# Patient Record
Sex: FEMALE | Employment: FULL TIME | URBAN - METROPOLITAN AREA
[De-identification: names, ages, dates, MRNs, and addresses within clinical notes are randomized per-mention and may not be internally consistent; named-entity substitution may affect disease eponyms.]

---

## 2017-11-07 ENCOUNTER — OFFICE VISIT (OUTPATIENT)
Dept: ORTHOPEDIC SURGERY | Age: 37
End: 2017-11-07

## 2017-11-07 VITALS
DIASTOLIC BLOOD PRESSURE: 76 MMHG | HEART RATE: 62 BPM | SYSTOLIC BLOOD PRESSURE: 122 MMHG | WEIGHT: 237 LBS | TEMPERATURE: 98 F | OXYGEN SATURATION: 100 % | HEIGHT: 71 IN | BODY MASS INDEX: 33.18 KG/M2

## 2017-12-05 ENCOUNTER — DOCUMENTATION ONLY (OUTPATIENT)
Dept: ORTHOPEDIC SURGERY | Age: 37
End: 2017-12-05

## 2017-12-05 ENCOUNTER — OFFICE VISIT (OUTPATIENT)
Dept: ORTHOPEDIC SURGERY | Age: 37
End: 2017-12-05

## 2017-12-05 VITALS
DIASTOLIC BLOOD PRESSURE: 76 MMHG | TEMPERATURE: 98.1 F | OXYGEN SATURATION: 99 % | HEIGHT: 71 IN | HEART RATE: 73 BPM | WEIGHT: 238.2 LBS | SYSTOLIC BLOOD PRESSURE: 129 MMHG | RESPIRATION RATE: 18 BRPM | BODY MASS INDEX: 33.35 KG/M2

## 2017-12-05 DIAGNOSIS — M75.101 TEAR OF RIGHT ROTATOR CUFF, UNSPECIFIED TEAR EXTENT: Primary | ICD-10-CM

## 2017-12-05 RX ORDER — DICLOFENAC SODIUM 10 MG/G
GEL TOPICAL
COMMUNITY
Start: 2017-09-11

## 2017-12-05 RX ORDER — IBUPROFEN 800 MG/1
TABLET ORAL
COMMUNITY
Start: 2017-12-04
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
## 2017-12-14 ENCOUNTER — HOSPITAL ENCOUNTER (OUTPATIENT)
Age: 37
Discharge: HOME | End: 2017-12-14
Attending: ORTHOPAEDIC SURGERY
Payer: OTHER GOVERNMENT

## 2017-12-14 DIAGNOSIS — M75.101 TEAR OF RIGHT ROTATOR CUFF, UNSPECIFIED TEAR EXTENT: ICD-10-CM

## 2017-12-14 PROCEDURE — 73221 MRI JOINT UPR EXTREM W/O DYE: CPT

## 2018-01-16 ENCOUNTER — OFFICE VISIT (OUTPATIENT)
Dept: ORTHOPEDIC SURGERY | Age: 38
End: 2018-01-16

## 2018-01-16 VITALS
HEIGHT: 71 IN | BODY MASS INDEX: 33.6 KG/M2 | HEART RATE: 63 BPM | OXYGEN SATURATION: 98 % | DIASTOLIC BLOOD PRESSURE: 73 MMHG | WEIGHT: 240 LBS | SYSTOLIC BLOOD PRESSURE: 125 MMHG | TEMPERATURE: 97.5 F

## 2018-01-16 DIAGNOSIS — M75.111 INCOMPLETE TEAR OF RIGHT ROTATOR CUFF: Primary | ICD-10-CM

## 2018-01-16 RX ORDER — TRIAMCINOLONE ACETONIDE 40 MG/ML
40 INJECTION, SUSPENSION INTRA-ARTICULAR; INTRAMUSCULAR ONCE
Qty: 1 ML | Refills: 0
Start: 2018-01-16 | End: 2018-01-16

## 2018-01-16 NOTE — PROGRESS NOTES
Brooklyn Osullivan  1980   Chief Complaint   Patient presents with    Shoulder Pain     right shoulder post mri        HISTORY OF PRESENT ILLNESS  Brooklyn Osullivan is a 42 y.o. female who presents today for reevaluation of right shoulder pain and to review MRI results. Patient rates pain as 6/10 today. She has had previous cortisone injections which eventually did not provide any more relief. Continues to have pain with overhead movements and reaching behind her back. She has night pain, finds herself waking up while sleeping. The patient is concerned that her pain while intensify as she ages. Patient denies any fever, chills, chest pain, shortness of breath or calf pain. There are no new illness or injuries to report since last seen in the office. There are no changes to medications, allergies, family or social history. PHYSICAL EXAM:   Visit Vitals    /73 (BP 1 Location: Left arm, BP Patient Position: Sitting)    Pulse 63    Temp 97.5 °F (36.4 °C)    Ht 5' 11\" (1.803 m)    Wt 240 lb (108.9 kg)    SpO2 98%    BMI 33.47 kg/m2     The patient is a well-developed, well-nourished female   in no acute distress. The patient is alert and oriented times three. The patient is alert and oriented times three. Mood and affect are normal.  LYMPHATIC: lymph nodes are not enlarged and are within normal limits  SKIN: normal in color and non tender to palpation. There are no bruises or abrasions noted. NEUROLOGICAL: Motor sensory exam is within normal limits. Reflexes are equal bilaterally.  There is normal sensation to pinprick and light touch  MUSCULOSKELETAL:  Examination Right shoulder   Skin Intact   AC joint tenderness -   Biceps tenderness -   Forward flexion/Elevation    Active abduction    Glenohumeral abduction 90   External rotation ROM 90   Internal rotation ROM 70   Apprehension -   Bos Relocation -   Jerk -   Load and Shift -   Obriens -   Speeds -   Impingement sign + Supraspinatus/Empty Can -, 5/5   External Rotation Strength -, 5/5   Lift Off/Belly Press -, 5/5   Neurovascular Intact      PROCEDURE: After sterile prep, 6 cc of Xylocaine and 1 cc of Kenalog were injected into the right shoulder. [facility] - [facility]  OFFICE PROCEDURE PROGRESS NOTE        Chart reviewed for the following:  Aissatou Juarez MD, have reviewed the History, Physical and updated the Allergic reactions for Elin Neff 40 performed immediately prior to start of procedure:  Aissatou Juarez MD, have performed the following reviews on [facility] prior to the start of the procedure:            * Patient was identified by name and date of birth   * Agreement on procedure being performed was verified  * Risks and Benefits explained to the patient  * Procedure site verified and marked as necessary  * Patient was positioned for comfort  * Consent was signed and verified     Time: 4:42 PM    Date of procedure: 1/16/2018    Procedure performed by:  Kayli Terrazas MD    Provider assisted by: (see medication administration)    How tolerated by patient: tolerated the procedure well with no complications    Comments: none      IMAGING: MRI of the right shoulder dated 12/14/17 was reviewed and read:   IMPRESSION:  1. Bursal surface fraying , mild interstitial tear of the supraspinatus tendon  with no full-thickness rupture, retraction or muscular atrophy. Mild subacromial  bursitis. 2. AC joint hypertrophy with inflammation, type II acromion and thickening of  the coracoacromial ligament. MRI of the right shoulder dated  11/29/16 was reviewed and read:   IMPRESSION:  1. Mild AC arthrosis and mild supraspinatus tendinosis with low-grade bursal sided fraying at critical zone. 2. Degeneration at the superior labrum. IMPRESSION:      ICD-10-CM ICD-9-CM    1.  Incomplete tear of right rotator cuff M75.111 840.4 TRIAMCINOLONE ACETONIDE INJ      triamcinolone acetonide (KENALOG) 40 mg/mL injection      DRAIN/INJECT LARGE JOINT/BURSA        PLAN:   1. I discussed the results of the MRI and the treatment options with the patient. Patient has an MRI documented right partial rotator cuff tear. She does not feel she is ready for surgery at this time. Risk factors include: n/a  2. Yes cortisone injection indicated today R SHOULDER  3. No Physical/Occupational Therapy indicated today  4. No diagnostic test indicated today  5. No durable medical equipment indicated today  6. No referral indicated today   7. No medications indicated today  8. No Narcotic indicated today    RTC 4 weeks if pain continues  Follow-up Disposition: Not on File    Scribed by Mary [address]) as dictated by Leesa Quiroz MD    I, Dr. Leesa Quiroz, confirm that all documentation is accurate.     Leesa Quiroz M.D.   [facility]

## 2018-02-20 ENCOUNTER — OFFICE VISIT (OUTPATIENT)
Dept: ORTHOPEDIC SURGERY | Age: 38
End: 2018-02-20

## 2018-02-20 VITALS
DIASTOLIC BLOOD PRESSURE: 94 MMHG | HEIGHT: 71 IN | HEART RATE: 79 BPM | OXYGEN SATURATION: 97 % | WEIGHT: 245.4 LBS | SYSTOLIC BLOOD PRESSURE: 117 MMHG | TEMPERATURE: 97.6 F | BODY MASS INDEX: 34.35 KG/M2

## 2018-02-20 DIAGNOSIS — M75.111 INCOMPLETE TEAR OF RIGHT ROTATOR CUFF: Primary | ICD-10-CM

## 2018-02-20 NOTE — PROGRESS NOTES
Yumiko Louis  1980   Chief Complaint   Patient presents with    Shoulder Pain     RIGHT, LAST INJECTION 3 WEEKS AGO        HISTORY OF PRESENT ILLNESS  Yumiko Louis is a 42 y.o. female who presents today for reevaluation of right shoulder pain. Patient rates pain as 6/10 today. At last OV, patient had a cortisone injection which provided a couple of weeks of relief. Her pain has been gradually returning. She has pain with overhead movements and reaching behind her back. She has night pain. Has taken ibuprofen. Patient works a desk job. Patient denies any fever, chills, chest pain, shortness of breath or calf pain. There are no new illness or injuries to report since last seen in the office. There are no changes to medications, allergies, family or social history. PHYSICAL EXAM:   Visit Vitals    BP (!) 117/94    Pulse 79    Temp 97.6 °F (36.4 °C)    Ht 5' 11\" (1.803 m)    Wt 245 lb 6.4 oz (111.3 kg)    SpO2 97%    BMI 34.23 kg/m2     The patient is a well-developed, well-nourished female   in no acute distress. The patient is alert and oriented times three. The patient is alert and oriented times three. Mood and affect are normal.  LYMPHATIC: lymph nodes are not enlarged and are within normal limits  SKIN: normal in color and non tender to palpation. There are no bruises or abrasions noted. NEUROLOGICAL: Motor sensory exam is within normal limits. Reflexes are equal bilaterally.  There is normal sensation to pinprick and light touch  MUSCULOSKELETAL:  Examination Right shoulder   Skin Intact   AC joint tenderness -   Biceps tenderness -   Forward flexion/Elevation    Active abduction    Glenohumeral abduction 90   External rotation ROM 90   Internal rotation ROM 70   Apprehension -   Bos Relocation -   Jerk -   Load and Shift -   Obriens -   Speeds -   Impingement sign +   Supraspinatus/Empty Can -, 5/5   External Rotation Strength -, 5/5   Lift Off/Belly Press -, 5/5 Neurovascular Intact        IMAGING: MRI of the right shoulder dated 12/14/17 was reviewed and read:   IMPRESSION:  1. Bursal surface fraying , mild interstitial tear of the supraspinatus tendon  with no full-thickness rupture, retraction or muscular atrophy. Mild subacromial bursitis. 2. AC joint hypertrophy with inflammation, type II acromion and thickening of the coracoacromial ligament. MRI of the right shoulder dated  11/29/16 was reviewed and read:   IMPRESSION:  1. Mild AC arthrosis and mild supraspinatus tendinosis with low-grade bursal sided fraying at critical zone. 2. Degeneration at the superior labrum. IMPRESSION:      ICD-10-CM ICD-9-CM    1. Incomplete tear of right rotator cuff M75.111 840.4         PLAN:   1. I discussed the risks and benefits and potential adverse outcomes of both operative vs non operative treatment of right rotator cuff tear with the patient. Patient wishes to proceed with right shoulder arthroscopic rotator cuff repair. Risks of operative intervention include but not limited to bleeding, infection, deep vein thrombosis, pulmonary embolism, death, limb length discrepancy, reflexive sympathetic dystrophy, fat embolism syndrome,damage to blood vessels and nerves, malunion, non-union, delayed union, failure of hardware, post traumatic arthritis, stroke, heart attack, and death. Patient understands that infection may arise and may require numerous surgeries. History and physical exam scheduled for a later date. Risk factors include: n/a  2. No cortisone injection indicated today   3. No Physical/Occupational Therapy indicated today  4. No diagnostic test indicated today  5. No durable medical equipment indicated today  6. No referral indicated today   7. No medications indicated today  8.  No Narcotic indicated today    RTC H&P  Follow-up Disposition: Not on File    Scribed by Mulu Curry [facility]) as dictated by MD DUSTY Mckeon Dr. Kayli Terrazas, confirm that all documentation is accurate.     Kayli Terrazas M.D.   [facility]

## 2018-08-14 ENCOUNTER — OFFICE VISIT (OUTPATIENT)
Dept: ORTHOPEDIC SURGERY | Age: 38
End: 2018-08-14

## 2018-08-14 VITALS
OXYGEN SATURATION: 98 % | HEIGHT: 71 IN | BODY MASS INDEX: 33.04 KG/M2 | WEIGHT: 236 LBS | SYSTOLIC BLOOD PRESSURE: 124 MMHG | DIASTOLIC BLOOD PRESSURE: 77 MMHG | HEART RATE: 63 BPM

## 2018-08-14 DIAGNOSIS — M75.111 INCOMPLETE TEAR OF RIGHT ROTATOR CUFF: Primary | ICD-10-CM

## 2018-08-14 RX ORDER — OXYCODONE HYDROCHLORIDE 5 MG/1
5 TABLET ORAL
Qty: 40 TAB | Refills: 0 | Status: SHIPPED | OUTPATIENT
Start: 2018-08-14

## 2018-08-14 RX ORDER — PROMETHAZINE HYDROCHLORIDE 25 MG/1
25 TABLET ORAL
Qty: 30 TAB | Refills: 0 | Status: SHIPPED | OUTPATIENT
Start: 2018-08-14

## 2018-08-14 RX ORDER — GABAPENTIN 300 MG/1
300 CAPSULE ORAL
Qty: 5 CAP | Refills: 0 | Status: SHIPPED | OUTPATIENT
Start: 2018-08-14 | End: 2018-08-19

## 2018-08-14 NOTE — PROGRESS NOTES
HISTORY AND PHYSICAL          Patient: Aggie Gomez                MRN: [id number]       SSN: [id number]  YOB: 1980          AGE: 34 y.o. SEX: female      Patient scheduled for:  Right shoulder arthroscopic rotator cuff repair    Surgeon: Emily Courtney MD    ANESTHESIA TYPE:  General    HISTORY:     The patient was seen in the office today for a preoperative history and physical for an upcoming above listed surgery. The patient is a pleasant 34 y.o. female who has a history of right shoulder pain. patient had a cortisone injection which provided a couple of weeks of relief. Her pain has been gradually returning. She has pain with overhead movements and reaching behind her back. She has night pain. Has taken ibuprofen. Patient works a desk job . Pain level is a 7/10. Due to the current findings, affected activity of daily living and continued pain and discomfort, surgical intervention is indicated. The alternatives, risks, and complications, including but not limited to infection, blood loss, need for blood transfusion, neurovascular damage, abby-incisional numbness, subcutaneous hematoma, bone fracture, anesthetic complications, DVT, PE, death, RSD, postoperative stiffness and pain, possible surgical scar, delayed healing and nonhealing, reflexive sympathetic dystrophy, damage to blood vessels and nerves, need for more surgery, MI, and stroke,  failure of hardware, gait disturbances,have been discussed. The patient understands and wishes to proceed with surgery. PAST MEDICAL HISTORY:     History reviewed. No pertinent past medical history. CURRENT MEDICATIONS:     Current Outpatient Prescriptions   Medication Sig Dispense Refill    gabapentin (NEURONTIN) 300 mg capsule Take 1 Cap by mouth nightly for 5 days. START NIGHT OF SURGERY 5 Cap 0    oxyCODONE IR (ROXICODONE) 5 mg immediate release tablet Take 1 Tab by mouth every four (4) hours as needed for Pain.  Max Daily Amount: 30 mg. DO NOT TAKE UNTIL AFTER SURGERY 40 Tab 0    promethazine (PHENERGAN) 25 mg tablet Take 1 Tab by mouth every six (6) hours as needed for Nausea. 30 Tab 0    diclofenac (VOLTAREN) 1 % gel       ibuprofen (MOTRIN) 800 mg tablet          ALLERGIES:     Allergies   Allergen Reactions    Other Food Rash     Sulfa ingredients         SURGICAL HISTORY:     Past Surgical History:   Procedure Laterality Date    HX KNEE ARTHROSCOPY  1993       SOCIAL HISTORY:     Social History     Social History    Marital status: UNKNOWN     Spouse name: N/A    Number of children: N/A    Years of education: N/A     Social History Main Topics    Smoking status: Never Smoker    Smokeless tobacco: Never Used    Alcohol use Yes      Comment: SOCIALLY    Drug use: No    Sexual activity: Not Asked     Other Topics Concern    None     Social History Narrative       FAMILY HISTORY:     Family History   Problem Relation Age of Onset    Diabetes Mother     Diabetes Maternal Grandfather        REVIEW OF SYSTEMS:     Negative for fevers, chills, chest pain, shortness of breath, weight loss, recent illness     General: Negative for fever and chills. No unexpected change in weight. Denies fatigue. No change in appetite. Skin: Negative for rash or itching. HEENT: Negative for congestion, sore throat, neck pain and neck stiffness. No change in vision or hearing. Hasn't noted any enlarged lymph nodes in the neck. Cardiovascular:  Negative for chest pain and palpitations. Has not noted pedal edema. Respiratory: Negative for cough, colds, sinus, hemoptysis, shortness of breath and wheezing. Gastrointestinal: Negative for nausea and vomiting, rectal bleeding, coffee ground emesis, abdominal pain, diarrhea and constipation. Genitourinary: Negative for dysuria, frequency urgency, or burning on micturition. No flank pain, no foul smelling urine, no difficulty with initiating urination.    Hematological: Negative for bleeding or easy bruising. Musculoskeletal: Negative  for arthralgias, back pain or neck pain. Neurological: Negative for dizziness, seizures or syncopal episodes. Denies headaches. Endocrine: Denies excessive thirst.  No heat/cold intolerance. Psychiatric: Negative for depression or insomnia. PHYSICAL EXAMINATION:     VITALS:   Visit Vitals    /77    Pulse 63    Ht 5' 11\" (1.803 m)    Wt 236 lb (107 kg)    SpO2 98%    BMI 32.92 kg/m2     GEN:  Well developed, well nourished 34 y.o. female in no acute distress. HEENT: Normocephalic and atraumatic. Eyes: Conjunctivae and EOM are normal.Pupils are equal, round, and reactive to light. External ear normal appearance, external nose normal appearing. Mouth/Throat: Oropharynx is clear and moist, able to handle oral secretions w/out difficulty, airway patent  NECK: Supple. Normal ROM, No lymphadenopathy. Trachea is midline. No bruising, swelling or deformity  RESP: Clear to auscultation bilaterally. No wheezes, rales, rhonchi. Normal effort and breath sounds. No respiratory distress  CARDIO:  Normal rate, regular rhythm and normal heart sounds. No MGR. ABDOMEN: Soft, non-tender, non-distended, normoactive bowel sounds in all four quadrants. There is no tenderness. There is no rebound and no guarding.    BACK: No CVA or spinal tenderness  BREAST:  Deferred  PELVIC:    Deferred   RECTAL:  Deferred   :           Deferred  EXTREMITIES: EXAMINATION OF: right shoulder  Examination Right shoulder   Skin Intact   AC joint tenderness -   Biceps tenderness -   Forward flexion/Elevation    Active abduction    Glenohumeral abduction 90   External rotation ROM 90   Internal rotation ROM 70   Apprehension -   Bos Relocation -   Jerk -   Load and Shift -   Obriens -   Speeds -   Impingement sign +   Supraspinatus/Empty Can +   External Rotation Strength -, 5/5   Lift Off/Belly Press -, 5/5   Neurovascular Intact       NEUROVASCULAR: Sensation intact to light touch and strength grossly intact and symmetrical. No nystagmus. Positive distal pulses and capillary refill. DVT ASSESSMENT:  There is not  calf tenderness. No evidence of DVT seen on physical exam.  MOTOR: In tact  PSYCH: Alert an oriented to person, place and time. Mood, memory, affect, behavior and judgment normal       RADIOGRAPHS & DIAGNOSTIC STUDIES:     MRI/xray reveals : MRI of the right shoulder dated 12/14/17 was reviewed and read:   IMPRESSION:  1. Bursal surface fraying , mild interstitial tear of the supraspinatus tendon  with no full-thickness rupture, retraction or muscular atrophy. Mild subacromial bursitis. 2. AC joint hypertrophy with inflammation, type II acromion and thickening of the coracoacromial ligament. LABS:       None needed    ASSESSMENT:       Encounter Diagnosis   Name Primary?  Incomplete tear of right rotator cuff Yes       PLAN:     Again, the alternatives, risks, and complications, as well as expected outcome were discussed. The patient understands and agrees to proceed with right shoulder arthroscopic rotator cuff repair.  Patient given orders listed below:    Orders Placed This Encounter    gabapentin (NEURONTIN) 300 mg capsule    oxyCODONE IR (ROXICODONE) 5 mg immediate release tablet    promethazine (PHENERGAN) 25 mg tablet         Robyn Elizondo PA-C  8/14/2018  2:53 PM

## 2018-08-24 ENCOUNTER — TELEPHONE (OUTPATIENT)
Dept: ORTHOPEDIC SURGERY | Age: 38
End: 2018-08-24

## 2018-08-24 NOTE — TELEPHONE ENCOUNTER
Emily Walker and I both looked and were unable to find it at [facility].  Spoke with patient and she is going to bring in a copy at her next visit

## 2018-08-24 NOTE — TELEPHONE ENCOUNTER
Patient called asking if we received some Susana paperwork that was faxed to the [facility] . She states it has been faxed several times. Please advise patient if needed at [phone].

## 2018-08-28 ENCOUNTER — OFFICE VISIT (OUTPATIENT)
Dept: ORTHOPEDIC SURGERY | Age: 38
End: 2018-08-28

## 2018-08-28 VITALS — WEIGHT: 236 LBS | BODY MASS INDEX: 33.04 KG/M2 | HEIGHT: 71 IN

## 2018-08-28 DIAGNOSIS — M75.101 TEAR OF RIGHT ROTATOR CUFF, UNSPECIFIED TEAR EXTENT: Primary | ICD-10-CM

## 2018-08-28 RX ORDER — TRAMADOL HYDROCHLORIDE 50 MG/1
50 TABLET ORAL
Qty: 40 TAB | Refills: 0 | Status: SHIPPED | OUTPATIENT
Start: 2018-08-28

## 2018-08-28 NOTE — PROGRESS NOTES
Chelsea Rivas  1980     HISTORY OF PRESENT ILLNESS  Chelsea Rivas is a 34 y.o. female who presents today for evaluation s/p Right shoulder arthroscopic rotator cuff repair on 8/23/18. Patient has been going to PT. Describes pain as a 8/10. Has been taking ibuprofen for pain. Still has night pain. Patient denies any fever, chills, chest pain, shortness of breath or calf pain. There are no new illness or injuries to report since last seen in the office. PHYSICAL EXAM:   Visit Vitals    Ht 5' 11\" (1.803 m)    Wt 236 lb (107 kg)    LMP 08/06/2018    BMI 32.92 kg/m2      The patient is a well-developed, well-nourished female in no acute distress. The patient is alert and oriented times three. The patient appears to be well groomed. Mood and affect are normal.  ORTHOPEDIC EXAM of right shoulder:  Inspection: swelling not present,  Bruising not present  Incision, clean, dry, intact, sutures in place  Passive glenohumeral abduction 0-45 degrees  Stability: Stable  Strength: n/a  2+ distal pulses    IMPRESSION:  S/P Right shoulder arthroscopic rotator cuff repair    PLAN:   Incisions cleaned. Surgery was discussed at length today. Patient to continue with PROM and PT. Continue wearing sling. Stressed to patient that nothing causes an increase in pain.   RTC 3 weeks    Robyn Elizondo PA-C  [address] [place] [address] and Spine Specialist

## 2018-09-06 ENCOUNTER — DOCUMENTATION ONLY (OUTPATIENT)
Dept: ORTHOPEDIC SURGERY | Age: 38
End: 2018-09-06

## 2018-09-06 NOTE — PROGRESS NOTES
Arthuro Guillermo form faxed to [facility] location. Please advise at [phone] when complete.

## 2018-09-17 ENCOUNTER — OFFICE VISIT (OUTPATIENT)
Dept: ORTHOPEDIC SURGERY | Age: 38
End: 2018-09-17

## 2018-09-17 ENCOUNTER — DOCUMENTATION ONLY (OUTPATIENT)
Dept: ORTHOPEDIC SURGERY | Age: 38
End: 2018-09-17

## 2018-09-17 VITALS
TEMPERATURE: 98.2 F | WEIGHT: 229 LBS | SYSTOLIC BLOOD PRESSURE: 129 MMHG | HEIGHT: 71 IN | DIASTOLIC BLOOD PRESSURE: 75 MMHG | HEART RATE: 73 BPM | BODY MASS INDEX: 32.06 KG/M2 | OXYGEN SATURATION: 99 %

## 2018-09-17 DIAGNOSIS — M75.111 INCOMPLETE TEAR OF RIGHT ROTATOR CUFF: Primary | ICD-10-CM

## 2018-09-17 RX ORDER — LORAZEPAM 0.5 MG/1
TABLET ORAL
COMMUNITY
Start: 2018-09-04

## 2018-09-17 RX ORDER — BUPROPION HCL 150 MG
TABLET, EXTENDED RELEASE 24 HR ORAL
COMMUNITY
Start: 2018-08-13

## 2018-09-17 NOTE — LETTER
NOTIFICATION RETURN TO WORK / SCHOOL 
 
9/17/2018 1:56 PM 
 
Ms. Hina Anderson [address] [place] [id number] To Whom It May Concern: 
 
Hina Anderson is currently under the care of [address] [place] [address]. She had surgery on 8/23/18. Her estimated return to work date is 10/22/18. If there are questions or concerns please have the patient contact our office. Sincerely, Dane Foster MD

## 2018-09-17 NOTE — PROGRESS NOTES
Left voicemail to return our call. If patient calls back, please come get nurse Silvestre Linton, I have questions regarding form.

## 2018-09-17 NOTE — PROGRESS NOTES
Maxwell Calle  1980     HISTORY OF PRESENT ILLNESS  Maxwell Calle is a 34 y.o. female who presents today for evaluation s/p Right shoulder arthroscopic rotator cuff repair on 8/23/18. Patient has been going to PT. Describes pain as a 7/10. The patient notes some pain after PT sessions. Has been doing exercises at home. Has been taking ibuprofen for pain. The patient uses ice for the pain. Still has night pain, has tried sitting up to sleep. Patient denies any fever, chills, chest pain, shortness of breath or calf pain. There are no new illness or injuries to report since last seen in the office. PHYSICAL EXAM:   Visit Vitals    /75    Pulse 73    Temp 98.2 °F (36.8 °C) (Oral)    Ht 5' 11\" (1.803 m)    Wt 229 lb (103.9 kg)    SpO2 99%    BMI 31.94 kg/m2      The patient is a well-developed, well-nourished female in no acute distress. The patient is alert and oriented times three. The patient appears to be well groomed. Mood and affect are normal.  ORTHOPEDIC EXAM of right shoulder:  Inspection: swelling not present,  Bruising not present  Incision, clean, dry, intact, sutures in place  Passive glenohumeral abduction 0-45 degrees  Stability: Stable  Strength: n/a  2+ distal pulses    IMPRESSION:  S/P Right shoulder arthroscopic rotator cuff repair    PLAN:   Incisions cleaned. Surgery was discussed at length today. Patient to continue with PROM and transition to AROM in two weeks. Cont PT. Continue wearing sling for 2 weeks. Stressed to patient that nothing causes an increase in pain. I would like her to try taking Tramadol during the day to see if that helps the pain. RTC 4 weeks    Scribed by Erica Sanchez [facility]) as dictated by Armin Hoffmann MD    I, Dr. Armin Hoffmann, confirm that all documentation is accurate.     Armin Hoffmann M.D.   [facility]

## 2018-10-01 ENCOUNTER — TELEPHONE (OUTPATIENT)
Dept: ORTHOPEDIC SURGERY | Age: 38
End: 2018-10-01

## 2018-10-01 NOTE — TELEPHONE ENCOUNTER
Dr. Haven Urrutia, physical therapist at [phone] [facility] DR COLUMBA CASTANEDA, Is asking if she clear to progress on her range of motion next appt with Dr. Marian Lo is [phone]

## 2018-10-03 ENCOUNTER — TELEPHONE (OUTPATIENT)
Dept: ORTHOPEDIC SURGERY | Age: 38
End: 2018-10-03

## 2018-10-15 ENCOUNTER — OFFICE VISIT (OUTPATIENT)
Dept: ORTHOPEDIC SURGERY | Age: 38
End: 2018-10-15

## 2018-10-15 DIAGNOSIS — M75.101 TEAR OF RIGHT ROTATOR CUFF, UNSPECIFIED TEAR EXTENT: Primary | ICD-10-CM

## 2018-10-15 NOTE — PROGRESS NOTES
Tawana Kim 1980 HISTORY OF PRESENT ILLNESS Tawana Kim is a 34 y.o. female who presents today for evaluation s/p Right shoulder arthroscopic rotator cuff repair on 8/23/18. Patient has been going to PT. Describes pain as a ***/10. The patient notes some pain after PT sessions. Has been doing exercises at home. Has been taking ibuprofen for pain. The patient uses ice for the pain. Still has night pain, has tried sitting up to sleep. Patient denies any fever, chills, chest pain, shortness of breath or calf pain. There are no new illness or injuries to report since last seen in the office. PHYSICAL EXAM:  
There were no vitals taken for this visit. The patient is a well-developed, well-nourished female in no acute distress. The patient is alert and oriented times three. The patient appears to be well groomed. Mood and affect are normal. 
LYMPHATIC: lymph nodes are not enlarged and are within normal limits SKIN: normal in color and non tender to palpation. There are no bruises or abrasions noted. NEUROLOGICAL: Motor sensory exam is within normal limits. Reflexes are equal bilaterally. There is normal sensation to pinprick and light touch ORTHOPEDIC EXAM of right shoulder: Inspection: swelling not present,  Bruising not present Incision, clean, dry, intact, sutures in place Passive glenohumeral abduction 0-45 degrees Stability: Stable Strength: n/a 
2+ distal pulses IMPRESSION:  S/P Right shoulder arthroscopic rotator cuff repair PLAN:  
1. *** Risk factors include: *** 
2. {YES (DEF)/NO:45326} ultrasound exam indicated today 3. {YES (DEF)/NO:81035} cortisone injection indicated today 4. {YES (DEF)/NO:45937} Physical/Occupational Therapy indicated today 5. {YES (DEF)/NO:05548} diagnostic test indicated today: 6. {YES (DEF)/NO:87153} durable medical equipment indicated today 7. {YES (DEF)/NO:89738} referral indicated today 8. {YES (DEF)/NO:23869} medications indicated today:  
9. {YES (DEF)/NO:05751} Narcotic indicated today for short term acute pain secondary to ***. Patient given pain medication for short term acute pain relief. Goal is to treat patient according to above plan and to ultimately have patient off all pain medications once appropriate. If chronic pain management is required beyond what is expected for current orthopedic problem, will refer patient to pain management.  was reviewed and will be reviewed with every medication refill request.  
 
 
RTC *** Incisions cleaned. Surgery was discussed at length today. Patient to continue with PROM and transition to AROM in two weeks. Cont PT. Continue wearing sling for 2 weeks. Stressed to patient that nothing causes an increase in pain. I would like her to try taking Tramadol during the day to see if that helps the pain. RTC *** Scribed by Lamar Mercado ([address]) as dictated by Waqas Tinoco MD 
 
I, Dr. Waqas Tinoco, confirm that all documentation is accurate.  
 
Waqas Tinoco M.D.  
[facility]

## 2018-10-15 NOTE — PROGRESS NOTES
Diego Watts  1980     HISTORY OF PRESENT ILLNESS  Diego Watts is a 34 y.o. female who presents today for evaluation s/p Right shoulder arthroscopic rotator cuff repair on 8/23/18. Patient has been going to PT. Describes pain as a 6/10. The patient notes some pain after PT sessions. Has been doing exercises at home. Has been taking ibuprofen for pain. The patient uses ice for the pain. Still has night pain, has tried sitting up to sleep. Patient denies any fever, chills, chest pain, shortness of breath or calf pain. There are no new illness or injuries to report since last seen in the office. PHYSICAL EXAM:   There were no vitals taken for this visit. The patient is a well-developed, well-nourished female in no acute distress. The patient is alert and oriented times three. The patient appears to be well groomed. Mood and affect are normal.  ORTHOPEDIC EXAM of right shoulder:  Inspection: swelling not present,  Bruising not present  Incisions well healed  Passive glenohumeral abduction 0-90 degrees  Stability: Stable  Strength: n/a  2+ distal pulses    IMPRESSION:  S/P Right shoulder arthroscopic rotator cuff repair    PLAN:   1. Patient improving post operatively  2. Will cont with PT. D/c sling today. arom now.  No lifting greater than 5lbs      RTC 4 weeks    [facility]

## 2018-10-16 ENCOUNTER — DOCUMENTATION ONLY (OUTPATIENT)
Dept: ORTHOPEDIC SURGERY | Age: 38
End: 2018-10-16

## 2018-10-19 ENCOUNTER — TELEPHONE (OUTPATIENT)
Dept: ORTHOPEDIC SURGERY | Age: 38
End: 2018-10-19

## 2018-10-19 NOTE — TELEPHONE ENCOUNTER
(post op) Pt is calling because she is having tingling and numbess in her rt hand. Please advise pt at [phone].

## 2018-10-19 NOTE — LETTER
NOTIFICATION RETURN TO WORK / SCHOOL 
 
10/22/2018 11:18 AM 
 
Ms. Judy Haley [address] [place] [phone] To Whom It May Concern: 
 
Judy Haley is currently under the care of [address] [place] [address] Jose [facility] Schwartz. She will return to work on 10/29/18. If there are questions or concerns please have the patient contact our office. Sincerely, Tavo Baird MD

## 2018-10-19 NOTE — TELEPHONE ENCOUNTER
Have her remove her sling when sedentary.  Have her bend her elbow and her wrist. Cont to ice shoulder

## 2018-10-19 NOTE — TELEPHONE ENCOUNTER
Pt is calling because she has some more questions regarding messages above. Pt is aware that Monday might be when she will hear from someone. Please advise pt at [phone].

## 2018-10-22 ENCOUNTER — DOCUMENTATION ONLY (OUTPATIENT)
Dept: ORTHOPEDIC SURGERY | Age: 38
End: 2018-10-22

## 2018-10-22 NOTE — TELEPHONE ENCOUNTER
Informed patient that her work note is ready to be picked up at the [facility] location. Patient also had questions regarding numbness and tingling in her pinky and ring finger of operative arm. She would like to know if this is normal and/or how long should she expect to be having this.

## 2018-10-22 NOTE — TELEPHONE ENCOUNTER
Spoke with patient, she was supposed to return to work today but since she is still having tingling and numbness she would like to be out of work a little longer. Can we change her return to work date to 10/29/18.

## 2018-10-22 NOTE — TELEPHONE ENCOUNTER
This can happen from wearing a sling. Have her remove her sling when sedentary.  Have her bend her elbow and her wrist. Cont to ice shoulder

## 2018-10-26 ENCOUNTER — TELEPHONE (OUTPATIENT)
Dept: ORTHOPEDIC SURGERY | Age: 38
End: 2018-10-26

## 2018-10-26 NOTE — LETTER
NOTIFICATION RETURN TO WORK / SCHOOL 
 
10/26/2018 1:05 PM 
 
Ms. Yaima Villegas [address] [place] [phone] To Whom It May Concern: 
 
Yaima Villegas is currently under the care of [facility] [place] [facility]. She will remain on a no duty work status until she is re-evaluated at her follow up appointment on 11/12/18. If there are questions or concerns please have the patient contact our office. Sincerely, Orlando Baumann MD

## 2018-10-26 NOTE — TELEPHONE ENCOUNTER
Patient called to advise that she's still experiencing numbness in her hand and cannot return to work on Monday as planned. Her rt hand went numb 4x during pt yesterday. She has an upcoming appt 11/12 and isn't sure if she should plan on staying out that long or just wait to see when the numbness subsides. She's being reassessed by p/t on 10/31.  Please review and advise patient at [phone]

## 2018-10-26 NOTE — TELEPHONE ENCOUNTER
Carlos Hartmann from [employer] is calling for clarification on work status. Please advise Carlos Hartmann at [phone].

## 2018-10-26 NOTE — TELEPHONE ENCOUNTER
Put in new work note to keep patient out of work until 11/12/18. Spoke with patient and informed her that we have a new letter ready to be picked up at the [facility] location.

## 2018-11-12 ENCOUNTER — OFFICE VISIT (OUTPATIENT)
Dept: ORTHOPEDIC SURGERY | Age: 38
End: 2018-11-12

## 2018-11-12 VITALS
HEART RATE: 70 BPM | SYSTOLIC BLOOD PRESSURE: 137 MMHG | DIASTOLIC BLOOD PRESSURE: 54 MMHG | TEMPERATURE: 97.2 F | OXYGEN SATURATION: 99 % | HEIGHT: 71 IN | WEIGHT: 235 LBS | BODY MASS INDEX: 32.9 KG/M2

## 2018-11-12 DIAGNOSIS — M54.2 NECK PAIN: ICD-10-CM

## 2018-11-12 DIAGNOSIS — M75.121 COMPLETE TEAR OF RIGHT ROTATOR CUFF: Primary | ICD-10-CM

## 2018-11-12 RX ORDER — METHYLPREDNISOLONE 4 MG/1
TABLET ORAL
Qty: 1 DOSE PACK | Refills: 0 | Status: SHIPPED | OUTPATIENT
Start: 2018-11-12

## 2018-11-12 NOTE — PROGRESS NOTES
Bryce Lamb  1980     HISTORY OF PRESENT ILLNESS  Bryce Lamb is a 34 y.o. female who presents today for evaluation s/p Right shoulder arthroscopic rotator cuff repair on 18. Patient has been going to PT. Describes pain as a 5/10. Feels like the shoulder is improving however still having spasms in her neck with intermittent numbness of her hand. She still has night pain. Patient denies any fever, chills, chest pain, shortness of breath or calf pain. The remainder of the review of systems is negative. There are no new illness or injuries to report since last seen in the office. No changes in medications, allergies, social or family history. Arellanoduran Bee PHYSICAL EXAM:   Visit Vitals  /54   Pulse 70   Temp 97.2 °F (36.2 °C)   Ht 5' 10.5\" (1.791 m)   Wt 235 lb (106.6 kg)   SpO2 99%   BMI 33.24 kg/m²      The patient is a well-developed, well-nourished female in no acute distress. The patient is alert and oriented times three. The patient appears to be well groomed. Mood and affect are normal.  ORTHOPEDIC EXAM of right shoulder:  Inspection: swelling not present,  Bruising not present  Incisions well healed  Passive glenohumeral abduction 0-90 degrees, able to reach up overhead  Stability: Stable  Strength: 4+/5  2+ distal pulses  Examination Neck   Skin Intact   Tenderness, Paracervical +   Paracervical spasms  +   Flexion Decreased 25%   Extension Decreased 25%   Lateral bend left Normal   Lateral bend right Normal   Masses -   Spurling sign -   Biceps reflex Normal   Triceps reflex Normal   Brachioradialis reflex Normal   Sensation Normal     IMAGIN view xray images of cspine on 2018 read and reviewed by myself reveal loss of cervical lordosis     IMPRESSION:  S/P Right shoulder arthroscopic rotator cuff repair    PLAN:   1. Patient with persistent paresthesias. Will place on medrol dose pack. Will cont with PT as listed below  Risk factors include: n/a  2.  No cortisone injection indicated today   3. Yes Physical/Occupational Therapy indicated today : WILL CONT WITH gentle strengthening and eval and treat cspine  4. No diagnostic test indicated today:   5. No durable medical equipment indicated today  6. No referral indicated today   7. YES medications indicated today: medrol dose pack  8.  No Narcotic indicated today     RTC 3 weeks    HERIBERTO [facility]

## 2018-11-14 ENCOUNTER — DOCUMENTATION ONLY (OUTPATIENT)
Dept: ORTHOPEDIC SURGERY | Facility: CLINIC | Age: 38
End: 2018-11-14

## 2018-11-14 NOTE — PROGRESS NOTES
Michell Toussaint Return to Work form was received via fax and put in the forms bin at [employer].

## 2018-11-15 ENCOUNTER — TELEPHONE (OUTPATIENT)
Dept: ORTHOPEDIC SURGERY | Age: 38
End: 2018-11-15

## 2018-11-15 NOTE — LETTER
NOTIFICATION RETURN TO WORK / SCHOOL 
 
11/19/2018 11:36 AM 
 
Ms. Tawana Kim [address] [place] [phone] To Whom It May Concern: 
 
Tawana Kim is currently under the care of [address] [place] [address] [place]. She is to remain out of work until 12/3/18. If there are questions or concerns please have the patient contact our office. Sincerely, ALLIE Musa

## 2018-11-15 NOTE — TELEPHONE ENCOUNTER
Left message to return call. If patient returns call please find [facility] [employer]. I have questions regarding her form.

## 2018-11-16 NOTE — TELEPHONE ENCOUNTER
Patient returned call to office - staff gone for the day, patient left a number to reach her on Monday - [phone]

## 2018-11-19 NOTE — TELEPHONE ENCOUNTER
Spoke with patient this morning, she states that she is not back to work yet. She states was prescribed a medrol dose pack and was to be out of work until re-evaluated at f/u appt on Dec. 3rd.

## 2018-12-04 ENCOUNTER — OFFICE VISIT (OUTPATIENT)
Dept: ORTHOPEDIC SURGERY | Age: 38
End: 2018-12-04

## 2018-12-04 VITALS
RESPIRATION RATE: 16 BRPM | OXYGEN SATURATION: 99 % | DIASTOLIC BLOOD PRESSURE: 75 MMHG | TEMPERATURE: 97.4 F | HEIGHT: 71 IN | SYSTOLIC BLOOD PRESSURE: 125 MMHG | WEIGHT: 227 LBS | HEART RATE: 64 BPM | BODY MASS INDEX: 31.78 KG/M2

## 2018-12-04 DIAGNOSIS — G56.21 ULNAR NEUROPATHY OF RIGHT UPPER EXTREMITY: ICD-10-CM

## 2018-12-04 DIAGNOSIS — M75.121 COMPLETE TEAR OF RIGHT ROTATOR CUFF: Primary | ICD-10-CM

## 2018-12-04 NOTE — PROGRESS NOTES
Elo Talavera 1980 HISTORY OF PRESENT ILLNESS Elo Talavera is a 34 y.o. female who presents today for evaluation s/p Right shoulder arthroscopic rotator cuff repair on 18. Patient has been going to PT. Describes pain as a 5/10. Feels like the shoulder is improving however still having spasms in her neck with intermittent numbness of her hand. She still has night pain. She reports the pain is better than before surgery, but the ROM is still lacking. States she cannot reach behind her back. She states there is numbness in her 4th and 5th digit. Patient denies any fever, chills, chest pain, shortness of breath or calf pain. The remainder of the review of systems is negative. There are no new illness or injuries to report since last seen in the office. No changes in medications, allergies, social or family history. Ilir Miranda PHYSICAL EXAM:  
Visit Vitals /75 Pulse 64 Temp 97.4 °F (36.3 °C) (Oral) Resp 16 Ht 5' 10.5\" (1.791 m) Wt 227 lb (103 kg) SpO2 99% BMI 32.11 kg/m² The patient is a well-developed, well-nourished female in no acute distress. The patient is alert and oriented times three. The patient appears to be well groomed. Mood and affect are normal. 
ORTHOPEDIC EXAM of right shoulder: Inspection: swelling not present,  Bruising not present Incisions well healed Passive glenohumeral abduction 0-90 degrees, able to reach up overhead Stability: Stable Strength: 4+/5 
2+ distal pulses Examination Neck Skin Intact Tenderness, Paracervical + Paracervical spasms  + Flexion Decreased 25% Extension Decreased 25% Lateral bend left Normal  
Lateral bend right Normal  
Masses - Spurling sign - Biceps reflex Normal  
Triceps reflex Normal  
Brachioradialis reflex Normal  
Sensation Normal  
numbnes over ulnar nerve distribution IMAGIN view xray images of cspine on 2018 read and reviewed by myself reveal loss of cervical lordosis IMPRESSION:  S/P Right shoulder arthroscopic rotator cuff repair PLAN:  
1. Patient with presents today with numbness in the 4th and 5th digit. I would like her to get an EMG to assess the ulnar nerve. Risk factors include: n/a 2. No cortisone injection indicated today 3. No Physical Therapy indicated today: 
4. Yes diagnostic test indicated today: EMG R UPPER EXT 5. No durable medical equipment indicated today 6. No referral indicated today 7. No medications indicated today: 8. No Narcotic indicated today RTC following EMG Scribed by Thanh Morales ([address]) as dictated by Emmanuel Aguilar MD 
 
I, Dr. Emmanuel Aguilar, confirm that all documentation is accurate.  
 
Emmanuel Aguilar M.D.  
[facility]

## 2018-12-05 ENCOUNTER — DOCUMENTATION ONLY (OUTPATIENT)
Dept: ORTHOPEDIC SURGERY | Age: 38
End: 2018-12-05

## 2018-12-05 NOTE — PROGRESS NOTES
Susana Extension Form was received via fax and put in the forms bin at [facility].

## 2018-12-06 ENCOUNTER — DOCUMENTATION ONLY (OUTPATIENT)
Dept: ORTHOPEDIC SURGERY | Age: 38
End: 2018-12-06

## 2018-12-06 ENCOUNTER — TELEPHONE (OUTPATIENT)
Dept: ORTHOPEDIC SURGERY | Age: 38
End: 2018-12-06

## 2018-12-06 NOTE — LETTER
NOTIFICATION RETURN TO WORK / SCHOOL 
 
12/10/2018 8:37 AM 
 
Ms. Sally Vidal [address] [place] [phone] To Whom It May Concern: 
 
Sally Vidal is currently under the care of [place] [facility]. She will remain on a no duty work status until she is re-evaluated at her follow up appointment on 1/8/19. If there are questions or concerns please have the patient contact our office. Sincerely, ALLIE Rivera

## 2018-12-06 NOTE — TELEPHONE ENCOUNTER
Pt calling to speak with Emma Flores. Pt states she was told to call when some documentation was finished and gave no further information. Please advise pt at [phone].

## 2018-12-10 ENCOUNTER — DOCUMENTATION ONLY (OUTPATIENT)
Dept: ORTHOPEDIC SURGERY | Age: 38
End: 2018-12-10

## 2019-01-02 ENCOUNTER — OFFICE VISIT (OUTPATIENT)
Dept: ORTHOPEDIC SURGERY | Age: 39
End: 2019-01-02

## 2019-01-02 VITALS
DIASTOLIC BLOOD PRESSURE: 80 MMHG | HEART RATE: 64 BPM | OXYGEN SATURATION: 97 % | BODY MASS INDEX: 34.72 KG/M2 | HEIGHT: 71 IN | SYSTOLIC BLOOD PRESSURE: 122 MMHG | TEMPERATURE: 98.1 F | WEIGHT: 248 LBS | RESPIRATION RATE: 16 BRPM

## 2019-01-02 DIAGNOSIS — R20.2 NUMBNESS AND TINGLING IN RIGHT HAND: Primary | ICD-10-CM

## 2019-01-02 DIAGNOSIS — R20.0 NUMBNESS AND TINGLING IN RIGHT HAND: Primary | ICD-10-CM

## 2019-01-02 PROBLEM — E66.01 SEVERE OBESITY (HCC): Status: ACTIVE | Noted: 2019-01-02
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
## 2019-01-07 ENCOUNTER — TELEPHONE (OUTPATIENT)
Dept: ORTHOPEDIC SURGERY | Age: 39
End: 2019-01-07

## 2019-01-07 NOTE — TELEPHONE ENCOUNTER
Spoke with patient, she was checking to see if we received any forms for her job. I checked in the forms bin and did not see form. Patient will have them re-fax form.

## 2019-01-07 NOTE — TELEPHONE ENCOUNTER
Patient called office to r/s 1/8 appt as provider out of office, was r/s to 1/9, however, patient is now requesting a call back from the office - did not wish to leave a specific reason for call back request.  Please contact patient at [phone]

## 2019-01-09 ENCOUNTER — OFFICE VISIT (OUTPATIENT)
Dept: ORTHOPEDIC SURGERY | Age: 39
End: 2019-01-09

## 2019-01-09 VITALS
HEART RATE: 73 BPM | DIASTOLIC BLOOD PRESSURE: 71 MMHG | TEMPERATURE: 99.3 F | HEIGHT: 70 IN | SYSTOLIC BLOOD PRESSURE: 127 MMHG | BODY MASS INDEX: 33.79 KG/M2 | RESPIRATION RATE: 16 BRPM | WEIGHT: 236 LBS | OXYGEN SATURATION: 96 %

## 2019-01-09 DIAGNOSIS — R20.2 NUMBNESS AND TINGLING IN RIGHT HAND: Primary | ICD-10-CM

## 2019-01-09 DIAGNOSIS — R20.0 NUMBNESS AND TINGLING IN RIGHT HAND: Primary | ICD-10-CM

## 2019-01-09 DIAGNOSIS — M75.121 COMPLETE TEAR OF RIGHT ROTATOR CUFF: ICD-10-CM

## 2019-01-09 NOTE — PROGRESS NOTES
1. Have you been to the ER, urgent care clinic since your last visit? Hospitalized since your last visit? No 
 
2. Have you seen or consulted any other health care providers outside of the [address] [place] [address] since your last visit? Include any pap smears or colon screening.  No

## 2019-01-09 NOTE — PROGRESS NOTES
Jose Mckeon 1980 HISTORY OF PRESENT ILLNESS Jose Mckeon is a 34 y.o. female who presents today for evaluation s/p Right shoulder arthroscopic rotator cuff repair on 8/23/18. Patient has been going to PT. Describes pain as a 3/10. Feels like the shoulder is improving however still having spasms in her neck with intermittent numbness of her hand. She reports the pain is better than before surgery, but the ROM is still lacking. States she cannot reach behind her back. She states there is numbness in her 4th and 5th digit. The symptoms have improved since last OV, still some numbness and tingling in the arm and hand. Patient denies any fever, chills, chest pain, shortness of breath or calf pain. The remainder of the review of systems is negative. There are no new illness or injuries to report since last seen in the office. No changes in medications, allergies, social or family history. Hank Savage PHYSICAL EXAM:  
Visit Vitals /71 Pulse 73 Temp 99.3 °F (37.4 °C) (Oral) Resp 16 Ht 5' 10\" (1.778 m) Wt 236 lb (107 kg) SpO2 96% BMI 33.86 kg/m² The patient is a well-developed, well-nourished female in no acute distress. The patient is alert and oriented times three. The patient appears to be well groomed. Mood and affect are normal. 
ORTHOPEDIC EXAM of right shoulder: Inspection: swelling not present,  Bruising not present Incisions well healed Passive glenohumeral abduction 0-90 degrees, able to reach up overhead Stability: Stable Strength: 4+/5 
2+ distal pulses Examination Neck Skin Intact Tenderness, Paracervical + Paracervical spasms  + Flexion Decreased 25% Extension Decreased 25% Lateral bend left Normal  
Lateral bend right Normal  
Masses - Spurling sign - Biceps reflex Normal  
Triceps reflex Normal  
Brachioradialis reflex Normal  
Sensation Normal  
numbnes over ulnar nerve distribution IMAGING: EMG of the right upper extremity dated 1/2/19 was reviewed and read: IMPRESSION: 
This was a normal nerve conduction and EMG study showing there to be no signs of neuropathy, myopathy, or radiculopathy in the nerves and muscles tested 2 view xray images of rachel on 11/12/2018 read and reviewed by myself reveal loss of cervical lordosis IMPRESSION:  S/P Right shoulder arthroscopic rotator cuff repair PLAN:  
1. Patient with presents today with numbness in the 4th and 5th digit. The EMG results were normal and I expect her to continue to improve. She can go back to full-duty on 1/14/19. Risk factors include: n/a 2. No cortisone injection indicated today 3. No Physical Therapy indicated today: 4. No diagnostic test indicated today: 5. No durable medical equipment indicated today 6. No referral indicated today 7. No medications indicated today: 8. No Narcotic indicated today RTC prn 
Scribed by Tara Leblanc ([address] [place] [address] [address]) as dictated by Helena Bills MD 
 
I, Dr. Helena Bills, confirm that all documentation is accurate.  
 
Helena Bills M.D.  
[facility]

## 2019-01-11 ENCOUNTER — TELEPHONE (OUTPATIENT)
Dept: ORTHOPEDIC SURGERY | Age: 39
End: 2019-01-11

## 2019-01-11 NOTE — TELEPHONE ENCOUNTER
Spoke with Ms. Jonathan Villaseñor and informed her that her form was completed and faxed. She thanked us for helping with all for her forms.

## 2019-01-11 NOTE — TELEPHONE ENCOUNTER
Pt requesting nurse reinaldo to return her call regarding her paperwork. Pt declined to provide details.     Pt p#  Pt R#[phone]

## 2019-06-14 ENCOUNTER — DOCUMENTATION ONLY (OUTPATIENT)
Dept: ORTHOPEDIC SURGERY | Age: 39
End: 2019-06-14

## 2019-06-26 ENCOUNTER — DOCUMENTATION ONLY (OUTPATIENT)
Dept: ORTHOPEDIC SURGERY | Age: 39
End: 2019-06-26

## 2023-11-10 NOTE — TELEPHONE ENCOUNTER
Spoke with patient, she wanted to make sure that we received her form. Located form, called and left message for patient informing her that we received form and will work on completing it. room air

## 2025-04-01 NOTE — TELEPHONE ENCOUNTER
Spoke with patient, she verbalized understanding the instructions per ALLIE Cavazos. Patient will call if symptoms worsen. Alert and oriented, no focal deficits, no motor or sensory deficits.